# Patient Record
Sex: MALE | Race: WHITE | Employment: UNEMPLOYED | URBAN - NONMETROPOLITAN AREA
[De-identification: names, ages, dates, MRNs, and addresses within clinical notes are randomized per-mention and may not be internally consistent; named-entity substitution may affect disease eponyms.]

---

## 2020-09-23 ENCOUNTER — APPOINTMENT (OUTPATIENT)
Dept: GENERAL RADIOLOGY | Age: 30
End: 2020-09-23
Attending: EMERGENCY MEDICINE
Payer: SUBSIDIZED

## 2020-09-23 ENCOUNTER — HOSPITAL ENCOUNTER (EMERGENCY)
Age: 30
Discharge: HOME | End: 2020-09-23
Attending: EMERGENCY MEDICINE
Payer: SUBSIDIZED

## 2020-09-23 VITALS
RESPIRATION RATE: 20 BRPM | OXYGEN SATURATION: 96 % | BODY MASS INDEX: 37.52 KG/M2 | HEART RATE: 72 BPM | DIASTOLIC BLOOD PRESSURE: 90 MMHG | HEIGHT: 71 IN | WEIGHT: 268 LBS | TEMPERATURE: 97.8 F | SYSTOLIC BLOOD PRESSURE: 129 MMHG

## 2020-09-23 DIAGNOSIS — S62.344A CLOSED NONDISPLACED FRACTURE OF BASE OF FOURTH METACARPAL BONE OF RIGHT HAND, INITIAL ENCOUNTER: Primary | ICD-10-CM

## 2020-09-23 PROCEDURE — 75810000053 HC SPLINT APPLICATION

## 2020-09-23 PROCEDURE — 73130 X-RAY EXAM OF HAND: CPT

## 2020-09-23 PROCEDURE — 99283 EMERGENCY DEPT VISIT LOW MDM: CPT

## 2020-09-23 RX ORDER — HYDROCODONE BITARTRATE AND ACETAMINOPHEN 5; 325 MG/1; MG/1
1 TABLET ORAL
Qty: 10 TAB | Refills: 0 | Status: SHIPPED | OUTPATIENT
Start: 2020-09-23 | End: 2020-09-26

## 2020-09-23 RX ORDER — ALPRAZOLAM 1 MG/1
1 TABLET ORAL 2 TIMES DAILY
COMMUNITY
Start: 2020-08-27

## 2020-09-23 NOTE — ED NOTES
Sling applied at this time with care instructions. Capillary refill rechecked at right hand  Nail beds-brisk capillary refill. Patient verbally acknowledges understanding of fracture care instructions.

## 2020-09-23 NOTE — ED PROVIDER NOTES
26-year-old male punched a wall with his right hand having injury in the fourth fifth metacarpal region. Describes a bony deformity which he reduced prior to arrival.  No other injuries           History reviewed. No pertinent past medical history. History reviewed. No pertinent surgical history. History reviewed. No pertinent family history. Social History     Socioeconomic History    Marital status: SINGLE     Spouse name: Not on file    Number of children: Not on file    Years of education: Not on file    Highest education level: Not on file   Occupational History    Not on file   Social Needs    Financial resource strain: Not on file    Food insecurity     Worry: Not on file     Inability: Not on file    Transportation needs     Medical: Not on file     Non-medical: Not on file   Tobacco Use    Smoking status: Never Smoker    Smokeless tobacco: Never Used   Substance and Sexual Activity    Alcohol use: Never     Frequency: Never     Comment: once a month    Drug use: Never    Sexual activity: Yes   Lifestyle    Physical activity     Days per week: Not on file     Minutes per session: Not on file    Stress: Not on file   Relationships    Social connections     Talks on phone: Not on file     Gets together: Not on file     Attends Evangelical service: Not on file     Active member of club or organization: Not on file     Attends meetings of clubs or organizations: Not on file     Relationship status: Not on file    Intimate partner violence     Fear of current or ex partner: Not on file     Emotionally abused: Not on file     Physically abused: Not on file     Forced sexual activity: Not on file   Other Topics Concern    Not on file   Social History Narrative    Not on file         ALLERGIES: Patient has no known allergies. Review of Systems   All other systems reviewed and are negative.       Vitals:    09/23/20 1030 09/23/20 1036   BP: (!) 143/83    Pulse: 87    Resp: 15    Temp: 97.8 °F (36.6 °C)    SpO2: 99% 99%   Weight: 121.6 kg (268 lb)    Height: 5' 11\" (1.803 m)             Physical Exam  Vitals signs and nursing note reviewed. Constitutional:       Appearance: Normal appearance. Musculoskeletal:         General: Swelling, tenderness and signs of injury present. Comments: Tenderness and swelling in the proximal fourth fifth metacarpal region-no obvious deformity   Skin:     General: Skin is dry. Comments: No skin injury   Neurological:      General: No focal deficit present. Mental Status: He is alert and oriented to person, place, and time. Sensory: No sensory deficit. MDM  Number of Diagnoses or Management Options  Diagnosis management comments: X-ray of the hand and wrist shows a proximal fracture of the fourth metacarpal that is minimally displaced by my interpretation.   Ulnar gutter will be wrist splint placed with nurse and sling         Procedures

## 2020-09-23 NOTE — ED TRIAGE NOTES
Pt states approximately 2 hours ago he punched a wall with his right hand. Pt reports there was a deformity to the right lateral aspect of the right wrist at the time, but he \"pulled it into place\". Pt has little movement to the area, swelling noted, no deformity or bruising present. Pt rates pain at 9/10. Pt motrin 800mg 2 hours ago for pain.

## 2020-09-29 ENCOUNTER — OFFICE VISIT (OUTPATIENT)
Dept: ORTHOPEDIC SURGERY | Age: 30
End: 2020-09-29
Payer: SUBSIDIZED

## 2020-09-29 VITALS — HEIGHT: 71 IN | WEIGHT: 265 LBS | BODY MASS INDEX: 37.1 KG/M2

## 2020-09-29 DIAGNOSIS — M25.531 RIGHT WRIST PAIN: Primary | ICD-10-CM

## 2020-09-29 DIAGNOSIS — E66.01 SEVERE OBESITY (HCC): ICD-10-CM

## 2020-09-29 PROCEDURE — 99202 OFFICE O/P NEW SF 15 MIN: CPT | Performed by: ORTHOPAEDIC SURGERY

## 2020-09-29 NOTE — PATIENT INSTRUCTIONS

## 2020-09-29 NOTE — PROGRESS NOTES
Providers protocol for the intake nurse to complete in patient's chart:    Tanner Chester presents today for   Chief Complaint   Patient presents with    Wrist Pain     right    Hand Pain     right       Reason for visit - from intake sheet  Pain assessment  -  from intake sheet  Height - from intake sheet  Weight - from intake sheet  Medical Conditions - from intake sheet  Family medical history - from intake sheet  Social History, alcohol, smoking - from the intake sheet  The PHQ2 only questions - from the intake sheet  Learning Assessment - from the intake sheet    Temperature is taken by [facility] - written on intake sheet  [facility]    Provider will complete patient's chart

## 2020-09-29 NOTE — PROGRESS NOTES
Name: Yessi Méndez    : 1990     Service Dept: [facility]    Patient's Pharmacies:    [facility]/pharmacy #[id number] - [place]  [address] [place] [id number]  Phone: [phone] Fax: [phone]       Chief Complaint   Patient presents with    Wrist Pain     right    Hand Pain     right        Visit Vitals  Ht 5' 11\" (1.803 m)   Wt 265 lb (120.2 kg)   BMI 36.96 kg/m²        Allergies   Allergen Reactions    Tylenol [Acetaminophen] Unknown (comments)        Current Outpatient Medications   Medication Sig Dispense Refill    ALPRAZolam (XANAX) 1 mg tablet Take 1 mg by mouth two (2) times a day.  omeprazole (PRILOSEC) 40 mg capsule Take 1 Cap by mouth daily. 14 Cap 0        There is no problem list on file for this patient. History reviewed. No pertinent family history. Social History     Socioeconomic History    Marital status: SINGLE     Spouse name: Not on file    Number of children: Not on file    Years of education: Not on file    Highest education level: Not on file   Tobacco Use    Smoking status: Never Smoker    Smokeless tobacco: Never Used   Substance and Sexual Activity    Alcohol use: Never     Frequency: Never     Comment: once a month    Drug use: Never    Sexual activity: Yes        History reviewed. No pertinent surgical history. History reviewed. No pertinent past medical history. I have reviewed and agree with [address] and [facility] and intake form in chart and the record. Review of Systems:   Patient is a pleasant appearing individual, appropriately dressed, well hydrated, well nourished, who is alert, appropriately oriented for age, and in no acute distress with a normal gait and normal affect who does not appear to be in any significant pain.      Physical Exam:  Right wrist - grossly neurovascularly intact, good cap refill, positive tenderness to palpation, positive soft tissue swelling, decreased range of motion, decreased strength, skin intact. Left wrist- Grossly neurovascularly intact, good cap refill, full range of motion, no weakness, no swelling, no point tenderness, no skin lesions. Encounter Diagnoses     ICD-10-CM ICD-9-CM   1. Right wrist pain  M25.531 719.43          HPI:  The patient is here with a chief complaint of right wrist pain, dull, throbbing pain. X-rays are positive for fourth metacarpal base fracture. Movement makes it worse. Pain is 6/10. ROS:  10-point review of systems is positive for stiffness. X-rays are unremarkable except for small nondisplaced fracture at the base on AP, lateral and oblique, done in [facility].    Assessment/Plan:  1. Right hand fourth metacarpal base fracture. Plan at this point, activities as tolerated started, no heavy lifting. We will see the patient back as needed. We will splint for about 4 weeks and go from there. If the patient gets worse, he is to give me a call. No restrictions in the meantime. Return to Office: Follow-up and Dispositions    · Return if symptoms worsen or fail to improve. Scribed by Tessa Reinoso LPN as dictated by [facility] [place] JT Morgan MD.    Documentation True and Accepted Cristobal Morgan MD

## 2020-09-30 PROBLEM — E66.01 SEVERE OBESITY (HCC): Status: ACTIVE | Noted: 2020-09-30

## 2021-01-15 ENCOUNTER — HOSPITAL ENCOUNTER (EMERGENCY)
Age: 31
Discharge: HOME | End: 2021-01-15
Attending: EMERGENCY MEDICINE
Payer: SUBSIDIZED

## 2021-01-15 VITALS
HEART RATE: 94 BPM | SYSTOLIC BLOOD PRESSURE: 135 MMHG | TEMPERATURE: 98.1 F | HEIGHT: 71 IN | RESPIRATION RATE: 18 BRPM | BODY MASS INDEX: 38.89 KG/M2 | OXYGEN SATURATION: 100 % | DIASTOLIC BLOOD PRESSURE: 85 MMHG | WEIGHT: 277.78 LBS

## 2021-01-15 DIAGNOSIS — H16.133 UV KERATITIS, BILATERAL: Primary | ICD-10-CM

## 2021-01-15 PROCEDURE — 99285 EMERGENCY DEPT VISIT HI MDM: CPT

## 2021-01-15 PROCEDURE — 74011000250 HC RX REV CODE- 250: Performed by: EMERGENCY MEDICINE

## 2021-01-15 PROCEDURE — 74011000250 HC RX REV CODE- 250

## 2021-01-15 PROCEDURE — 74011250637 HC RX REV CODE- 250/637: Performed by: EMERGENCY MEDICINE

## 2021-01-15 RX ORDER — CIPROFLOXACIN HYDROCHLORIDE 3.5 MG/ML
1 SOLUTION/ DROPS TOPICAL
Status: DISCONTINUED | OUTPATIENT
Start: 2021-01-15 | End: 2021-01-16 | Stop reason: HOSPADM

## 2021-01-15 RX ORDER — TROPICAMIDE 10 MG/ML
1 SOLUTION/ DROPS OPHTHALMIC
Status: COMPLETED | OUTPATIENT
Start: 2021-01-15 | End: 2021-01-15

## 2021-01-15 RX ORDER — HYDROCODONE BITARTRATE AND ACETAMINOPHEN 5; 325 MG/1; MG/1
2 TABLET ORAL
Status: DISCONTINUED | OUTPATIENT
Start: 2021-01-15 | End: 2021-01-16 | Stop reason: HOSPADM

## 2021-01-15 RX ORDER — TETRACAINE HYDROCHLORIDE 5 MG/ML
2 SOLUTION OPHTHALMIC
Status: COMPLETED | OUTPATIENT
Start: 2021-01-15 | End: 2021-01-15

## 2021-01-15 RX ORDER — CIPROFLOXACIN HYDROCHLORIDE 3.5 MG/ML
1 SOLUTION/ DROPS TOPICAL 4 TIMES DAILY
Qty: 2.5 ML | Refills: 0 | Status: SHIPPED | OUTPATIENT
Start: 2021-01-15

## 2021-01-15 RX ADMIN — TETRACAINE HYDROCHLORIDE 2 DROP: 5 SOLUTION OPHTHALMIC at 21:29

## 2021-01-15 RX ADMIN — FLUORESCEIN SODIUM 2 STRIP: 1 STRIP OPHTHALMIC at 21:41

## 2021-01-15 RX ADMIN — TROPICAMIDE 1 DROP: 10 SOLUTION/ DROPS OPHTHALMIC at 21:29

## 2021-01-15 RX ADMIN — CIPROFLOXACIN 1 DROP: 3 SOLUTION OPHTHALMIC at 23:08

## 2021-01-15 NOTE — Clinical Note
[address]  [facility] EMERGENCY DEPT  [address] [facility] [facility] [address]  [place] [facility] [address] [phone]    Work/School Note    Date: 1/15/2021    To Whom It May concern:    Jareth Cuello was seen and treated today in the emergency room by the following provider(s):  Attending Provider: Monica Christiansen MD.      Jareth Cuello is excused from work/school on 01/15/21 and 01/16/21. He is medically clear to return to work/school on 1/17/2021.        Sincerely,          Kei Mckeon MD

## 2021-01-16 NOTE — ED PROVIDER NOTES
EMERGENCY DEPARTMENT HISTORY AND PHYSICAL EXAM  ?    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 male with no significant past medical history presents to the ED with cc of bilateral eye irritation and pain that started tonight after UV light exposure earlier in the day. Patient is a . Pain is severe. There are no other complaints, changes, or physical findings at this time. PCP: Cristiane Souza NP    No current facility-administered medications on file prior to encounter. Current Outpatient Medications on File Prior to Encounter:    ALPRAZolam (XANAX) 1 mg tablet, Take 1 mg by mouth two (2) times a day., Disp: , Rfl:     omeprazole (PRILOSEC) 40 mg capsule, Take 1 Cap by mouth daily. , Disp: 14 Cap, Rfl: 0        Past History    Past Medical History:  History reviewed. No pertinent past medical history. Past Surgical History:  History reviewed. No pertinent surgical history. Family History:  History reviewed. No pertinent family history. Social History:  Social History   Tobacco Use     Smoking status: Never Smoker     Smokeless tobacco: Current User     Tobacco comment: chews tobacco   Alcohol use: Not Currently     Frequency: Never     Comment: once a month   Drug use: Yes     Frequency: 2.0 times per week     Types: Marijuana      Allergies:  -- Tylenol (Acetaminophen) -- Unknown (comments)      Review of Systems  @[facility]@    Physical Exam  @[facility]@    Diagnostic Study Results    Labs -  No results found for this or any previous visit (from the past 12 hour(s)). Radiologic Studies -   No orders to display  CT Results  (Last 48 hours)   None     CXR Results  (Last 48 hours)   None         Medical Decision Making  I am the first provider for this patient.     I reviewed the vital signs, available nursing notes, past medical history, past surgical history, family history and social history. Vital Signs-Reviewed the patient's vital signs. Empty flowsheet group. Records Reviewed: Nursing Notes    Provider Notes (Medical Decision Making):   Patient presents with eye pain after UV light exposure, consistent with UV keratitis. ED Course:     Patient reported improvement with tetracaine ophthalmic solution. Initial assessment performed. The patients presenting problems have been discussed, and they are in agreement with the care plan formulated and outlined with them. I have encouraged them to ask questions as they arise throughout their visit. PROCEDURES  Corneal fluorescein staining. Tetracaine instilled. Patient reports improvement. Fluorescein staining is negative. PLAN:  1. Current Discharge Medication List    START taking these medications    ciprofloxacin HCl (Ciloxan) 0.3 % ophthalmic solution  Apply 1 Drop to eye four (4) times daily. Qty: 2.5 mL Refills: 0        2. Follow-up Information    None    Return to ED if worse     Diagnosis    Clinical Impression: UV keratitis, bilateral  (primary encounter diagnosis)      ? History reviewed. No pertinent past medical history. History reviewed. No pertinent surgical history. History reviewed. No pertinent family history.     Social History     Socioeconomic History    Marital status: SINGLE     Spouse name: Not on file    Number of children: Not on file    Years of education: Not on file    Highest education level: Not on file   Occupational History    Not on file   Social Needs    Financial resource strain: Not on file    Food insecurity     Worry: Not on file     Inability: Not on file    Transportation needs     Medical: Not on file     Non-medical: Not on file   Tobacco Use    Smoking status: Never Smoker    Smokeless tobacco: Current User    Tobacco comment: chews tobacco Substance and Sexual Activity    Alcohol use: Not Currently     Frequency: Never     Comment: once a month    Drug use: Yes     Frequency: 2.0 times per week     Types: Marijuana    Sexual activity: Yes   Lifestyle    Physical activity     Days per week: Not on file     Minutes per session: Not on file    Stress: Not on file   Relationships    Social connections     Talks on phone: Not on file     Gets together: Not on file     Attends Yarsanism service: Not on file     Active member of club or organization: Not on file     Attends meetings of clubs or organizations: Not on file     Relationship status: Not on file    Intimate partner violence     Fear of current or ex partner: Not on file     Emotionally abused: Not on file     Physically abused: Not on file     Forced sexual activity: Not on file   Other Topics Concern    Not on file   Social History Narrative    Not on file         ALLERGIES: Tylenol [acetaminophen]    Review of Systems   Constitutional: Negative. HENT: Negative. Eyes: Positive for photophobia, pain and redness. Respiratory: Negative. Neurological: Negative. Vitals:    01/15/21 2110   BP: (!) 146/81   Pulse: 94   Resp: 22   Temp: 97.7 °F (36.5 °C)   SpO2: 98%   Weight: 126 kg (277 lb 12.5 oz)   Height: 5' 11\" (1.803 m)            Physical Exam  Constitutional:       General: He is in acute distress. HENT:      Head: Normocephalic and atraumatic. Eyes:      General: Lids are normal.      Extraocular Movements: Extraocular movements intact. Conjunctiva/sclera:      Right eye: Right conjunctiva is injected. No exudate or hemorrhage. Left eye: Left conjunctiva is injected. No exudate or hemorrhage. Neurological:      Mental Status: He is alert.           MDM  Number of Diagnoses or Management Options  Risk of Complications, Morbidity, and/or Mortality  Presenting problems: low  Diagnostic procedures: low  Management options: moderate    Patient Progress  Patient progress: improved         Procedures